# Patient Record
Sex: FEMALE | Race: WHITE | NOT HISPANIC OR LATINO | Employment: OTHER | URBAN - METROPOLITAN AREA
[De-identification: names, ages, dates, MRNs, and addresses within clinical notes are randomized per-mention and may not be internally consistent; named-entity substitution may affect disease eponyms.]

---

## 2022-10-03 ENCOUNTER — TELEPHONE (OUTPATIENT)
Dept: OBGYN CLINIC | Facility: CLINIC | Age: 72
End: 2022-10-03

## 2022-10-03 DIAGNOSIS — Z12.31 ENCOUNTER FOR SCREENING MAMMOGRAM FOR MALIGNANT NEOPLASM OF BREAST: Primary | ICD-10-CM

## 2022-10-03 NOTE — TELEPHONE ENCOUNTER
Patient called nurse's line, would like order for mammo as the first one   Pt has not been seen since   Instructed pt she must make [place] first and then we can order mammo  Pt verbalized understanding and transferred up front to schedule  Mommogram ordered and placed up front for patient to

## 2022-10-31 DIAGNOSIS — Z12.31 ENCOUNTER FOR SCREENING MAMMOGRAM FOR MALIGNANT NEOPLASM OF BREAST: ICD-10-CM

## 2022-12-01 PROBLEM — M81.0 OSTEOPOROSIS WITHOUT PATHOLOGICAL FRACTURE: Status: ACTIVE | Noted: 2022-12-01

## 2022-12-01 NOTE — PROGRESS NOTES
Assessment/Plan:    Encounter for gynecological examination (general) (routine) without abnormal findings  All well, no complaints  Normal breast and pelvic exams  Last pap 2016 neg/HPV neg; no further indicated  Mammo order given, last 10/11/22  Colonoscopy 2017, due 2022  Dexa 11/2018; spine T-2 0; orders given    Osteoporosis without pathological fracture  Last Reclast 2015, dexa orders given  Will contact with results  Diagnoses and all orders for this visit:    Encounter for gynecological examination (general) (routine) without abnormal findings    Encounter for screening mammogram for malignant neoplasm of breast  -     Mammo screening bilateral w 3d & cad; Future    Osteoporosis without pathological fracture  -     DXA bone density spine hip and pelvis; Future    Other orders  -     Liquid-based pap, screening          Subjective:      Patient ID: Maco Tiwari is a 81 y o  female  HPI Here for Medicare biannual breast and pelvic exams  given      The following portions of the patient's history were reviewed and updated as appropriate:   She  has a past medical history of Osteoporosis without pathological fracture  She   Patient Active Problem List    Diagnosis Date Noted   • Encounter for gynecological examination (general) (routine) without abnormal findings 12/05/2022   • Osteoporosis without pathological fracture 12/01/2022     She  has a past surgical history that includes Breast biopsy (2017); Colonoscopy (05/2017); and DXA procedure(historical) (11/2018)  Her family history includes Colon polyps in her mother; Diabetes in her mother; Stroke in her mother  She  reports that she has never smoked  She has never used smokeless tobacco  She reports current alcohol use of about 3 0 standard drinks per week  She reports that she does not use drugs  No current outpatient medications on file  No current facility-administered medications for this visit       She has No Known Allergies       Review of Systems  No PMB, breast, bladder, bowel changes   No new persistent pain, bloating, early satiety or pelvic pressure      Objective:      /92 (BP Location: Left arm, Patient Position: Sitting, Cuff Size: Standard)   Ht 5' 5 2" (1 656 m)   Wt 74 6 kg (164 lb 6 4 oz)   Breastfeeding No   BMI 27 19 kg/m²          Physical Exam    General appearance: no distress, pleasant  Neck: thyroid without nodules or thyromegaly, no palpable adenopathy  Lymph nodes: no palpable adenopathy  Breasts: no masses, nodes or skin changes  Abdomen: soft, non tender, no palpable masses  Pelvic exam: normal atrophic external genitalia, urethral meatus normal, vagina atrophic without lesions, cervix atrophic without lesions, uterus small, non tender, no adnexal masses, non tender  Rectal exam: normal sphincter tone, no masses, RV confirms above

## 2022-12-05 ENCOUNTER — ANNUAL EXAM (OUTPATIENT)
Dept: OBGYN CLINIC | Facility: CLINIC | Age: 72
End: 2022-12-05

## 2022-12-05 VITALS
DIASTOLIC BLOOD PRESSURE: 92 MMHG | SYSTOLIC BLOOD PRESSURE: 134 MMHG | HEIGHT: 65 IN | WEIGHT: 164.4 LBS | BODY MASS INDEX: 27.39 KG/M2

## 2022-12-05 DIAGNOSIS — Z12.31 ENCOUNTER FOR SCREENING MAMMOGRAM FOR MALIGNANT NEOPLASM OF BREAST: ICD-10-CM

## 2022-12-05 DIAGNOSIS — M81.0 OSTEOPOROSIS WITHOUT PATHOLOGICAL FRACTURE: ICD-10-CM

## 2022-12-05 DIAGNOSIS — Z01.419 ENCOUNTER FOR GYNECOLOGICAL EXAMINATION (GENERAL) (ROUTINE) WITHOUT ABNORMAL FINDINGS: Primary | ICD-10-CM

## 2022-12-05 NOTE — PATIENT INSTRUCTIONS
Return to office in two years unless having any problems such as breast changes, bleeding, new persistent pain, new progressive bloating, new problems eating (getting full to quickly) or new constant urinary pressure that does not resolve in one week  Call in June 2024 for your December 2024 visit    Dr Yomaira Armijo at [facility] for [address]: [phone] for your colonoscopy

## 2022-12-05 NOTE — ASSESSMENT & PLAN NOTE
All well, no complaints  Normal breast and pelvic exams    Last pap 2016 neg/HPV neg; no further indicated  Mammo order given, last 10/11/22  Colonoscopy 2017, due 2022  Dexa 11/2018; spine T-2 0; orders given

## 2022-12-05 NOTE — LETTER
December 5, 2022     Jenny Cowart, [address] [facility] [id number] [facility]    Patient: Ariana Bacon   YOB: 1950   Date of Visit: 12/5/2022       Dear Dr Tulio Parsons:    Thank you for referring Melody Deleon to me for evaluation  Below are my notes for this consultation  If you have questions, please do not hesitate to call me  I look forward to following your patient along with you  Sincerely,        Herberth Luna MD        CC: No Recipients  Herberth Luna MD  12/5/2022  2:10 PM  Sign when Signing Visit  Assessment/Plan:    Encounter for gynecological examination (general) (routine) without abnormal findings  All well, no complaints  Normal breast and pelvic exams  Last pap 2016 neg/HPV neg; no further indicated  Mammo order given, last 10/11/22  Colonoscopy 2017, due 2022  Dexa 11/2018; spine T-2 0; orders given    Osteoporosis without pathological fracture  Last Reclast 2015, dexa orders given  Will contact with results  Diagnoses and all orders for this visit:    Encounter for gynecological examination (general) (routine) without abnormal findings    Encounter for screening mammogram for malignant neoplasm of breast  -     Mammo screening bilateral w 3d & cad; Future    Osteoporosis without pathological fracture  -     DXA bone density spine hip and pelvis; Future    Other orders  -     Liquid-based pap, screening         Subjective:     Patient ID: Ariana Bacon is a 81 y o  female  HPI Here for Medicare biannual breast and pelvic exams  given      The following portions of the patient's history were reviewed and updated as appropriate:   She  has a past medical history of Osteoporosis without pathological fracture    She   Patient Active Problem List    Diagnosis Date Noted   • Encounter for gynecological examination (general) (routine) without abnormal findings 12/05/2022   • Osteoporosis without pathological fracture 12/01/2022     She  has a past surgical history that includes Breast biopsy (2017); Colonoscopy (05/2017); and DXA procedure(historical) (11/2018)  Her family history includes Colon polyps in her mother; Diabetes in her mother; Stroke in her mother  She  reports that she has never smoked  She has never used smokeless tobacco  She reports current alcohol use of about 3 0 standard drinks per week  She reports that she does not use drugs  No current outpatient medications on file  No current facility-administered medications for this visit  She has No Known Allergies       Review of Systems No PMB, breast, bladder, bowel changes   No new persistent pain, bloating, early satiety or pelvic pressure      Objective:      /92 (BP Location: Left arm, Patient Position: Sitting, Cuff Size: Standard)   Ht 5' 5 2" (1 656 m)   Wt 74 6 kg (164 lb 6 4 oz)   Breastfeeding No   BMI 27 19 kg/m²         Physical Exam   General appearance: no distress, pleasant  Neck: thyroid without nodules or thyromegaly, no palpable adenopathy  Lymph nodes: no palpable adenopathy  Breasts: no masses, nodes or skin changes  Abdomen: soft, non tender, no palpable masses  Pelvic exam: normal atrophic external genitalia, urethral meatus normal, vagina atrophic without lesions, cervix atrophic without lesions, uterus small, non tender, no adnexal masses, non tender  Rectal exam: normal sphincter tone, no masses, RV confirms above

## 2023-01-16 DIAGNOSIS — M81.0 OSTEOPOROSIS WITHOUT PATHOLOGICAL FRACTURE: ICD-10-CM
